# Patient Record
Sex: MALE | Race: OTHER | Employment: UNEMPLOYED | URBAN - METROPOLITAN AREA
[De-identification: names, ages, dates, MRNs, and addresses within clinical notes are randomized per-mention and may not be internally consistent; named-entity substitution may affect disease eponyms.]

---

## 2024-10-04 ENCOUNTER — HOSPITAL ENCOUNTER (EMERGENCY)
Facility: HOSPITAL | Age: 19
Discharge: HOME | End: 2024-10-04
Attending: PEDIATRICS

## 2024-10-04 ENCOUNTER — APPOINTMENT (OUTPATIENT)
Dept: GENERAL RADIOLOGY | Facility: HOSPITAL | Age: 19
End: 2024-10-04
Attending: PEDIATRICS

## 2024-10-04 VITALS
WEIGHT: 130 LBS | TEMPERATURE: 99 F | SYSTOLIC BLOOD PRESSURE: 115 MMHG | OXYGEN SATURATION: 100 % | DIASTOLIC BLOOD PRESSURE: 80 MMHG | HEART RATE: 59 BPM | RESPIRATION RATE: 17 BRPM

## 2024-10-04 DIAGNOSIS — T14.8XXA MUSCLE STRAIN: Primary | ICD-10-CM

## 2024-10-04 DIAGNOSIS — R11.0 NAUSEA: ICD-10-CM

## 2024-10-04 DIAGNOSIS — R07.89 CHEST PAIN, ATYPICAL: ICD-10-CM

## 2024-10-04 LAB
ALBUMIN SERPL-MCNC: 4.4 G/DL (ref 3.2–4.8)
ALBUMIN/GLOB SERPL: 1.3 {RATIO} (ref 1–2)
ALP LIVER SERPL-CCNC: 82 U/L
ALT SERPL-CCNC: 14 U/L
ANION GAP SERPL CALC-SCNC: 0 MMOL/L (ref 0–18)
AST SERPL-CCNC: 21 U/L (ref ?–34)
ATRIAL RATE: 59 BPM
BASOPHILS # BLD AUTO: 0.05 X10(3) UL (ref 0–0.2)
BASOPHILS NFR BLD AUTO: 0.8 %
BILIRUB SERPL-MCNC: 0.5 MG/DL (ref 0.3–1.2)
BUN BLD-MCNC: 9 MG/DL (ref 9–23)
CALCIUM BLD-MCNC: 9.5 MG/DL (ref 8.7–10.4)
CHLORIDE SERPL-SCNC: 108 MMOL/L (ref 98–112)
CO2 SERPL-SCNC: 26 MMOL/L (ref 21–32)
CREAT BLD-MCNC: 1 MG/DL
EGFRCR SERPLBLD CKD-EPI 2021: 110 ML/MIN/1.73M2 (ref 60–?)
EOSINOPHIL # BLD AUTO: 0.24 X10(3) UL (ref 0–0.7)
EOSINOPHIL NFR BLD AUTO: 3.7 %
ERYTHROCYTE [DISTWIDTH] IN BLOOD BY AUTOMATED COUNT: 11.7 %
GLOBULIN PLAS-MCNC: 3.3 G/DL (ref 2–3.5)
GLUCOSE BLD-MCNC: 103 MG/DL (ref 70–99)
HCT VFR BLD AUTO: 48.5 %
HGB BLD-MCNC: 17.3 G/DL
IMM GRANULOCYTES # BLD AUTO: 0.01 X10(3) UL (ref 0–1)
IMM GRANULOCYTES NFR BLD: 0.2 %
LYMPHOCYTES # BLD AUTO: 1.47 X10(3) UL (ref 1–4)
LYMPHOCYTES NFR BLD AUTO: 22.9 %
MCH RBC QN AUTO: 32.4 PG (ref 26–34)
MCHC RBC AUTO-ENTMCNC: 35.7 G/DL (ref 31–37)
MCV RBC AUTO: 90.8 FL
MONOCYTES # BLD AUTO: 0.32 X10(3) UL (ref 0.1–1)
MONOCYTES NFR BLD AUTO: 5 %
NEUTROPHILS # BLD AUTO: 4.33 X10 (3) UL (ref 1.5–7.7)
NEUTROPHILS # BLD AUTO: 4.33 X10(3) UL (ref 1.5–7.7)
NEUTROPHILS NFR BLD AUTO: 67.4 %
OSMOLALITY SERPL CALC.SUM OF ELEC: 277 MOSM/KG (ref 275–295)
P AXIS: -17 DEGREES
P-R INTERVAL: 154 MS
PLATELET # BLD AUTO: 254 10(3)UL (ref 150–450)
POTASSIUM SERPL-SCNC: 4 MMOL/L (ref 3.5–5.1)
PROT SERPL-MCNC: 7.7 G/DL (ref 5.7–8.2)
Q-T INTERVAL: 386 MS
QRS DURATION: 86 MS
QTC CALCULATION (BEZET): 382 MS
R AXIS: -18 DEGREES
RBC # BLD AUTO: 5.34 X10(6)UL
SODIUM SERPL-SCNC: 134 MMOL/L (ref 136–145)
T AXIS: -25 DEGREES
TROPONIN I SERPL HS-MCNC: 24 NG/L
VENTRICULAR RATE: 59 BPM
WBC # BLD AUTO: 6.4 X10(3) UL (ref 4–11)

## 2024-10-04 PROCEDURE — 80053 COMPREHEN METABOLIC PANEL: CPT | Performed by: PEDIATRICS

## 2024-10-04 PROCEDURE — 71045 X-RAY EXAM CHEST 1 VIEW: CPT | Performed by: PEDIATRICS

## 2024-10-04 PROCEDURE — 85025 COMPLETE CBC W/AUTO DIFF WBC: CPT | Performed by: PEDIATRICS

## 2024-10-04 PROCEDURE — 93005 ELECTROCARDIOGRAM TRACING: CPT

## 2024-10-04 PROCEDURE — 84484 ASSAY OF TROPONIN QUANT: CPT | Performed by: PEDIATRICS

## 2024-10-04 PROCEDURE — 99285 EMERGENCY DEPT VISIT HI MDM: CPT

## 2024-10-04 PROCEDURE — 93010 ELECTROCARDIOGRAM REPORT: CPT

## 2024-10-04 PROCEDURE — 99284 EMERGENCY DEPT VISIT MOD MDM: CPT

## 2024-10-04 PROCEDURE — 96374 THER/PROPH/DIAG INJ IV PUSH: CPT

## 2024-10-04 PROCEDURE — 96375 TX/PRO/DX INJ NEW DRUG ADDON: CPT

## 2024-10-04 RX ORDER — ONDANSETRON 4 MG/1
4 TABLET, ORALLY DISINTEGRATING ORAL EVERY 4 HOURS PRN
Qty: 10 TABLET | Refills: 0 | Status: SHIPPED | OUTPATIENT
Start: 2024-10-04 | End: 2024-10-11

## 2024-10-04 RX ORDER — KETOROLAC TROMETHAMINE 10 MG/1
10 TABLET, FILM COATED ORAL EVERY 6 HOURS PRN
Qty: 16 TABLET | Refills: 0 | Status: SHIPPED | OUTPATIENT
Start: 2024-10-04 | End: 2024-10-08

## 2024-10-04 RX ORDER — KETOROLAC TROMETHAMINE 30 MG/ML
30 INJECTION, SOLUTION INTRAMUSCULAR; INTRAVENOUS ONCE
Status: COMPLETED | OUTPATIENT
Start: 2024-10-04 | End: 2024-10-04

## 2024-10-04 RX ORDER — ONDANSETRON 2 MG/ML
4 INJECTION INTRAMUSCULAR; INTRAVENOUS ONCE
Status: COMPLETED | OUTPATIENT
Start: 2024-10-04 | End: 2024-10-04

## 2024-10-04 NOTE — ED PROVIDER NOTES
Patient Seen in: [facility] Emergency Department      History     Chief Complaint   Patient presents with    Abdomen/Flank Pain    Nausea/Vomiting/Diarrhea     Stated Complaint: abdominal pain, vomiting    Subjective:   HPI    Patient is a 19-year-old male presenting the ED with nausea and vomiting for about a week.  He complains of some pain mainly in his upper right chest as well.  He feels like he has to vomit but nothing comes up when he tries.  No significant cough.    Objective:     History reviewed. No pertinent past medical history.           History reviewed. No pertinent surgical history.             Social History     Socioeconomic History    Marital status: Single   Tobacco Use    Smoking status: Never    Smokeless tobacco: Never   Substance and Sexual Activity    Alcohol use: Never    Drug use: Never                  Physical Exam     ED Triage Vitals [10/04/24 1021]   /90   Pulse 67   Resp 18   Temp 99.3 °F (37.4 °C)   Temp src Tympanic   SpO2 100 %   O2 Device None (Room air)       Current Vitals:   Vital Signs  BP: 115/80  Pulse: 59  Resp: 17  Temp: 99.3 °F (37.4 °C)  Temp src: Tympanic  MAP (mmHg): 92    Oxygen Therapy  SpO2: 100 %  O2 Device: None (Room air)        Physical Exam  HEENT: The pupils are equal round and react to light, oropharynx is clear, mucous membranes are moist.  Ears:left TM shows no erythema, right TM shows no erythema   Neck: Supple, full range of motion.  CV: Chest is clear to auscultation, no wheezes rales or rhonchi.  Cardiac exam normal S1-S2, no murmurs rubs or gallops.  Some tenderness on palpation of the right mid chest without obvious erythema or edema  Abdomen: Soft, nontender, nondistended.  Bowel sounds present throughout.  Extremities: Warm and well perfused.  Dermatologic exam: No rashes or lesions.  Mild gynecomastia noted on the right side  Neurologic exam: Cranial nerves 2-12 grossly intact.    Orthopedic exam: normal,from.    ED Course     Labs  Reviewed   COMP METABOLIC PANEL (14) - Abnormal; Notable for the following components:       Result Value    Glucose 103 (*)     Sodium 134 (*)     All other components within normal limits   TROPONIN I HIGH SENSITIVITY - Normal   CBC WITH DIFFERENTIAL WITH PLATELET     EKG    Rate, intervals and axes as noted on EKG Report.  Rate: 59  Rhythm: Sinus Rhythm  Reading: sinus bradycardia,normal intervals,normal axis,normal ecg                Radiology:  Imaging ordered independently visualized and interpreted by myself (along with review of radiologist's interpretation) and noted the following: Chest x-ray shows no evidence of focal abnormality by my read    XR CHEST AP PORTABLE  (CPT=71045)    Result Date: 10/4/2024  CONCLUSION:  No active cardiopulmonary process identified.   LOCATION:  [facility]      Dictated by (CST): William Louis MD on 10/04/2024 at 10:49 AM     Finalized by (CST): William Louis MD on 10/04/2024 at 10:49 AM        Labs:  ^^ Personally ordered, reviewed, and interpreted all unique tests ordered.  Clinically significant labs noted: CBC comp and troponin reviewed within normal limits    Medications administered:  Medications   ketorolac (Toradol) 30 MG/ML injection 30 mg (30 mg Intravenous Given 10/4/24 1141)   ondansetron (Zofran) 4 MG/2ML injection 4 mg (4 mg Intravenous Given 10/4/24 1141)       Pulse oximetry:  Pulse oximetry on room air is 100% and is normal.     Cardiac monitoring:  Initial heart rate is 67 and is normal for age    Vital signs:  Vitals:    10/04/24 1021 10/04/24 1045 10/04/24 1145   BP: 133/90 117/82 115/80   Pulse: 67 56 59   Resp: 18 21 17   Temp: 99.3 °F (37.4 °C)     TempSrc: Tympanic     SpO2: 100% 99% 100%   Weight: 59 kg         Chart review:  ^^ Review of prior external notes from unique sources (non-[facility] ED records): noted in history none         MDM      Patient presents with some right-sided chest pain nausea and malaise.  Differential considered includes viral  process, arrhythmia, pneumonia.  Patient's exam is benign.  Patient felt better after Toradol and Zofran.  He is asking to have his gynecomastia on the right side addressed and fixed and I explained that that is not something we would do in an emergency room.  He will follow closely with his PMD and return for worsening of symptoms    Patient was screened and evaluated during this visit.   As a treating physician attending to the patient, I determined, within reasonable clinical confidence and prior to discharge, that an emergency medical condition was not or was no longer present.  There was no indication for further evaluation, treatment or admission on an emergency basis.  Comprehensive verbal and written discharge and follow-up instructions were provided to help prevent relapse or worsening.  Patient was instructed to follow-up with the primary care provider for further evaluation and treatment, but to return immediately to the ER for worsening, concerning, new, changing or persisting symptoms.  I discussed the case with the patient/parent and they had no questions, complaints, or concerns.  Patient/parent felt comfortable going home.                    Medical Decision Making      Disposition and Plan     Clinical Impression:  1. Muscle strain    2. Chest pain, atypical    3. Nausea         Disposition:  Discharge  10/4/2024 11:35 am    Follow-up:  No follow-up provider specified.        Medications Prescribed:  Discharge Medication List as of 10/4/2024 11:42 AM        START taking these medications    Details   Ketorolac Tromethamine 10 MG Oral Tab Take 1 tablet (10 mg total) by mouth every 6 (six) hours as needed., Print, Disp-16 tablet, R-0      ondansetron 4 MG Oral Tablet Dispersible Take 1 tablet (4 mg total) by mouth every 4 (four) hours as needed for Nausea., Print, Disp-10 tablet, R-0                 Supplementary Documentation:

## 2024-10-04 NOTE — ED INITIAL ASSESSMENT (HPI)
Pt here for n/v for about a week and abd pain and chest pain, feels like he has to vomit recently but not nothing comes up